# Patient Record
Sex: MALE | Race: WHITE | URBAN - NONMETROPOLITAN AREA
[De-identification: names, ages, dates, MRNs, and addresses within clinical notes are randomized per-mention and may not be internally consistent; named-entity substitution may affect disease eponyms.]

---

## 2021-05-11 ENCOUNTER — APPOINTMENT (RX ONLY)
Dept: URBAN - NONMETROPOLITAN AREA CLINIC 1 | Facility: CLINIC | Age: 56
Setting detail: DERMATOLOGY
End: 2021-05-11

## 2021-05-11 DIAGNOSIS — L57.8 OTHER SKIN CHANGES DUE TO CHRONIC EXPOSURE TO NONIONIZING RADIATION: ICD-10-CM

## 2021-05-11 DIAGNOSIS — D485 NEOPLASM OF UNCERTAIN BEHAVIOR OF SKIN: ICD-10-CM

## 2021-05-11 PROBLEM — D48.5 NEOPLASM OF UNCERTAIN BEHAVIOR OF SKIN: Status: ACTIVE | Noted: 2021-05-11

## 2021-05-11 PROCEDURE — ? BIOPSY BY SHAVE METHOD

## 2021-05-11 PROCEDURE — ? ADDITIONAL NOTES

## 2021-05-11 PROCEDURE — ? COUNSELING

## 2021-05-11 PROCEDURE — 11102 TANGNTL BX SKIN SINGLE LES: CPT

## 2021-05-11 PROCEDURE — 99203 OFFICE O/P NEW LOW 30 MIN: CPT | Mod: 25

## 2021-05-11 PROCEDURE — 11103 TANGNTL BX SKIN EA SEP/ADDL: CPT

## 2021-05-11 ASSESSMENT — LOCATION SIMPLE DESCRIPTION DERM
LOCATION SIMPLE: LEFT ANTERIOR NECK
LOCATION SIMPLE: RIGHT SUPERIOR MUCOSAL LIP
LOCATION SIMPLE: SCALP
LOCATION SIMPLE: RIGHT FOREHEAD
LOCATION SIMPLE: POSTERIOR SCALP

## 2021-05-11 ASSESSMENT — LOCATION DETAILED DESCRIPTION DERM
LOCATION DETAILED: LEFT CLAVICULAR NECK
LOCATION DETAILED: RIGHT SUPERIOR MUCOSAL LIP
LOCATION DETAILED: RIGHT CENTRAL FRONTAL SCALP
LOCATION DETAILED: LEFT OCCIPITAL SCALP
LOCATION DETAILED: RIGHT SUPERIOR MEDIAL FOREHEAD

## 2021-05-11 ASSESSMENT — LOCATION ZONE DERM
LOCATION ZONE: LIP
LOCATION ZONE: SCALP
LOCATION ZONE: FACE
LOCATION ZONE: NECK

## 2021-05-11 NOTE — PROCEDURE: ADDITIONAL NOTES
Detail Level: Simple
Additional Notes: Patient consent was obtained to proceed with the visit and recommended plan of care after discussion of all risks and benefits, including the risks of COVID-19 exposure.
Additional Notes: Patient has been recommended to see his ENT
Render Risk Assessment In Note?: no

## 2021-05-11 NOTE — PROCEDURE: BIOPSY BY SHAVE METHOD
Body Location Override (Optional - Billing Will Still Be Based On Selected Body Map Location If Applicable): left post scalp

## 2021-05-11 NOTE — PROCEDURE: MIPS QUALITY
Quality 130: Documentation Of Current Medications In The Medical Record: Current Medications Documented
Quality 111:Pneumonia Vaccination Status For Older Adults: Pneumococcal Vaccination not Administered or Previously Received, Reason not Otherwise Specified
Quality 226: Preventive Care And Screening: Tobacco Use: Screening And Cessation Intervention: Patient screened for tobacco use and is an ex/non-smoker
Quality 110: Preventive Care And Screening: Influenza Immunization: Influenza Immunization not Administered because Patient Refused.
Quality 431: Preventive Care And Screening: Unhealthy Alcohol Use - Screening: Patient screened for unhealthy alcohol use using a single question and scores less than 2 times per year
Detail Level: Detailed

## 2022-12-15 ENCOUNTER — APPOINTMENT (RX ONLY)
Dept: URBAN - NONMETROPOLITAN AREA CLINIC 1 | Facility: CLINIC | Age: 57
Setting detail: DERMATOLOGY
End: 2022-12-15

## 2022-12-15 DIAGNOSIS — A63.0 ANOGENITAL (VENEREAL) WARTS: ICD-10-CM

## 2022-12-15 PROCEDURE — 54056 CRYOSURGERY PENIS LESION(S): CPT

## 2022-12-15 PROCEDURE — ? COUNSELING

## 2022-12-15 PROCEDURE — ? BENIGN DESTRUCTION (GENITALS)

## 2022-12-15 ASSESSMENT — LOCATION DETAILED DESCRIPTION DERM: LOCATION DETAILED: RIGHT DORSAL SHAFT OF PENIS

## 2022-12-15 ASSESSMENT — LOCATION ZONE DERM: LOCATION ZONE: PENIS

## 2022-12-15 ASSESSMENT — LOCATION SIMPLE DESCRIPTION DERM: LOCATION SIMPLE: PENIS

## 2022-12-15 NOTE — PROCEDURE: BENIGN DESTRUCTION (GENITALS)
Post-Care Instructions: I reviewed with the patient in detail post-care instructions. Patient is to wear sunprotection, and avoid picking at any of the treated lesions. Pt may apply Vaseline to crusted or scabbing areas.
Method: liquid nitrogen
Warning: This plan will only bill for destructions on the Penis and Vulva. If you select the Scrotum it will not bill.
Detail Level: Detailed
Anesthesia Volume In Cc: 0
Consent: The patient's consent was obtained including but not limited to risks of crusting, scabbing, blistering, scarring, darker or lighter pigmentary change, recurrence, incomplete removal and infection.
Render Post-Care Instructions In Note?: no

## 2023-01-05 ENCOUNTER — APPOINTMENT (RX ONLY)
Dept: URBAN - NONMETROPOLITAN AREA CLINIC 1 | Facility: CLINIC | Age: 58
Setting detail: DERMATOLOGY
End: 2023-01-05

## 2023-01-05 DIAGNOSIS — A63.0 ANOGENITAL (VENEREAL) WARTS: ICD-10-CM

## 2023-01-05 PROCEDURE — 54056 CRYOSURGERY PENIS LESION(S): CPT

## 2023-01-05 PROCEDURE — ? COUNSELING

## 2023-01-05 PROCEDURE — ? BENIGN DESTRUCTION (GENITALS)

## 2023-01-05 ASSESSMENT — LOCATION SIMPLE DESCRIPTION DERM: LOCATION SIMPLE: PENIS

## 2023-01-05 ASSESSMENT — LOCATION ZONE DERM: LOCATION ZONE: PENIS

## 2023-01-05 ASSESSMENT — LOCATION DETAILED DESCRIPTION DERM: LOCATION DETAILED: RIGHT DORSAL SHAFT OF PENIS
